# Patient Record
Sex: FEMALE | Race: WHITE | NOT HISPANIC OR LATINO | Employment: FULL TIME | URBAN - METROPOLITAN AREA
[De-identification: names, ages, dates, MRNs, and addresses within clinical notes are randomized per-mention and may not be internally consistent; named-entity substitution may affect disease eponyms.]

---

## 2018-06-17 ENCOUNTER — OFFICE VISIT (OUTPATIENT)
Dept: URGENT CARE | Facility: URGENT CARE | Age: 43
End: 2018-06-17
Payer: COMMERCIAL

## 2018-06-17 VITALS
SYSTOLIC BLOOD PRESSURE: 98 MMHG | HEART RATE: 80 BPM | DIASTOLIC BLOOD PRESSURE: 70 MMHG | OXYGEN SATURATION: 98 % | WEIGHT: 160 LBS | RESPIRATION RATE: 16 BRPM | TEMPERATURE: 98.8 F | BODY MASS INDEX: 25.11 KG/M2 | HEIGHT: 67 IN

## 2018-06-17 DIAGNOSIS — R82.90 NONSPECIFIC FINDING ON EXAMINATION OF URINE: ICD-10-CM

## 2018-06-17 DIAGNOSIS — N12 PYELONEPHRITIS: Primary | ICD-10-CM

## 2018-06-17 DIAGNOSIS — R10.31 ABDOMINAL PAIN, RIGHT LOWER QUADRANT: ICD-10-CM

## 2018-06-17 LAB
ALBUMIN UR-MCNC: 100 MG/DL
APPEARANCE UR: ABNORMAL
BACTERIA #/AREA URNS HPF: ABNORMAL /HPF
BILIRUB UR QL STRIP: NEGATIVE
COLOR UR AUTO: YELLOW
GLUCOSE UR STRIP-MCNC: NEGATIVE MG/DL
HGB UR QL STRIP: ABNORMAL
KETONES UR STRIP-MCNC: NEGATIVE MG/DL
LEUKOCYTE ESTERASE UR QL STRIP: ABNORMAL
NITRATE UR QL: NEGATIVE
NON-SQ EPI CELLS #/AREA URNS LPF: ABNORMAL /LPF
PH UR STRIP: 6 PH (ref 5–7)
RBC #/AREA URNS AUTO: ABNORMAL /HPF
SOURCE: ABNORMAL
SP GR UR STRIP: 1.02 (ref 1–1.03)
UROBILINOGEN UR STRIP-ACNC: 0.2 EU/DL (ref 0.2–1)
WBC #/AREA URNS AUTO: ABNORMAL /HPF

## 2018-06-17 PROCEDURE — 87088 URINE BACTERIA CULTURE: CPT | Performed by: FAMILY MEDICINE

## 2018-06-17 PROCEDURE — 87186 SC STD MICRODIL/AGAR DIL: CPT | Performed by: FAMILY MEDICINE

## 2018-06-17 PROCEDURE — 87086 URINE CULTURE/COLONY COUNT: CPT | Performed by: FAMILY MEDICINE

## 2018-06-17 PROCEDURE — 99203 OFFICE O/P NEW LOW 30 MIN: CPT | Mod: 25 | Performed by: FAMILY MEDICINE

## 2018-06-17 PROCEDURE — 96372 THER/PROPH/DIAG INJ SC/IM: CPT | Performed by: FAMILY MEDICINE

## 2018-06-17 PROCEDURE — 81001 URINALYSIS AUTO W/SCOPE: CPT | Performed by: FAMILY MEDICINE

## 2018-06-17 RX ORDER — PHENAZOPYRIDINE HYDROCHLORIDE 200 MG/1
200 TABLET, FILM COATED ORAL 3 TIMES DAILY PRN
Qty: 6 TABLET | Refills: 0 | Status: SHIPPED | OUTPATIENT
Start: 2018-06-17 | End: 2018-06-17

## 2018-06-17 RX ORDER — CEPHALEXIN 500 MG/1
500 CAPSULE ORAL 3 TIMES DAILY
Qty: 30 CAPSULE | Refills: 0 | Status: SHIPPED | OUTPATIENT
Start: 2018-06-17 | End: 2018-06-27

## 2018-06-17 RX ORDER — CEFTRIAXONE 1 G/1
1000 INJECTION, POWDER, FOR SOLUTION INTRAMUSCULAR; INTRAVENOUS ONCE
Qty: 10 ML | Refills: 0 | OUTPATIENT
Start: 2018-06-17 | End: 2018-06-17

## 2018-06-17 NOTE — PROGRESS NOTES
"SUBJECTIVE: Erin Strickland is a 42 year old female who complains of right lower quadrant pain x 3 days but worsening over night, with associated right flank pain, some feverish feeling and some chills but no abnormal vaginal discharge or bleeding.  Has h/o UTIs and did start taking Cipro 3 days ago.  Last stool was last night.  No nausea or vomiting.  H/o C/S x 1.      OBJECTIVE:  BP 98/70  Pulse 80  Temp 98.8  F (37.1  C) (Oral)  Resp 16  Ht 5' 6.75\" (1.695 m)  Wt 160 lb (72.6 kg)  LMP 05/17/2018  SpO2 98%  Breastfeeding? No  BMI 25.25 kg/m2   Appears well, in no apparent distress.  The abdomen has normal bowel sounds, is soft with mild suprapubic and right lower quadrant tenderness but no guarding, mass, or organomegaly.  Mild right lower quadrant rebound tenderness.  Right CVA tenderness.    Labs:   Results for orders placed or performed in visit on 06/17/18   *UA reflex to Microscopic and Culture ([place] [facility] (except [place] and [place])   Result Value Ref Range    Color Urine Yellow     Appearance Urine Slightly Cloudy     Glucose Urine Negative NEG^Negative mg/dL    Bilirubin Urine Negative NEG^Negative    Ketones Urine Negative NEG^Negative mg/dL    Specific Gravity Urine 1.020 1.003 - 1.035    Blood Urine Moderate (A) NEG^Negative    pH Urine 6.0 5.0 - 7.0 pH    Protein Albumin Urine 100 (A) NEG^Negative mg/dL    Urobilinogen Urine 0.2 0.2 - 1.0 EU/dL    Nitrite Urine Negative NEG^Negative    Leukocyte Esterase Urine Moderate (A) NEG^Negative    Source Midstream Urine    Urine Microscopic   Result Value Ref Range    WBC Urine 25-50 (A) OTO5^0 - 5 /HPF    RBC Urine 25-50 (A) OTO2^O - 2 /HPF    Squamous Epithelial /LPF Urine Moderate (A) FEW^Few /LPF    Bacteria Urine Many (A) NEG^Negative /HPF      ASSESSMENT:  Right pyelonephritis    PLAN: Treatment per orders - also push fluids.  UC is pending. Call or return to clinic prn if these symptoms worsen or fail to improve as " anticipated.   Shannen Ford MD

## 2018-06-17 NOTE — PATIENT INSTRUCTIONS
Discharge Instructions for Pyelonephritis  You have been told you have a kidney infection. This is called pyelonephritis. The infection can be serious. It can damage your kidneys and cause bacteria to enter your bloodstream. You were treated in the hospital. Once you return home, here s what you can do at home to aid in your recovery and prevent future infections.  Home care    Take all the medicine you were prescribed, even if you feel better. Not finishing the medicine can make the infection come back. It may also make a future infection harder to treat.    Unless told not to by your healthcare provider, drink 8 to 12 glasses of fluid every day. Clear fluids, such as water, are best. This may help flush the infection from your system.  Preventing future infection    Keep your genital area clean. Use mild soap. Rinse with water.    If you are a woman, always wipe the genital area from front to back.    Urinate frequently. Avoid holding urine in the bladder for a long time.    Always urinate after sexual intercourse.  Follow-up care  Follow up with your healthcare provider, or as advised. And see your healthcare provider for regular lab tests as directed.     When to call your healthcare provider  Call your healthcare provider right away if you have any of the following:    Decreased urine output or trouble urinating    Severe pain in the lower back or flank    Fever of 100.4 F (38 C) or higher, or as directed by your healthcare provider    Shaking chills    Vomiting    Blood in your urine    Dark-colored or foul-smelling urine    Nausea or other problems that prevent you from taking your prescribed medicine   Date Last Reviewed: 2/1/2017 2000-2017 [employer]. [address] [place] [address], [place] [address]. All rights reserved. This information is not intended as a substitute for professional medical care. Always follow your healthcare professional's instructions.        Kidney Infection (Adult  "Female)    An infection in one or both kidneys is called \"pyelonephritis.\" It usually happens when bacteria (or rarely, viruses, fungi, or other disease-causing organisms) get into the kidney. The bacteria (or other disease-causing organisms) can enter the kidneys from the bladder or blood traveling from other parts of the body. A kidney infection can become serious. It can cause severe illness, scarring of the kidneys, or kidney failure if not treated properly.  Common causes for this problem include:    Not keeping the genital area clean and dry, which promotes the growth of bacteria    Wiping back to front which drags bacteria from the rectum toward the urinary opening (urethra)    Wearing tight pants or underwear (this lets moisture build up in the genital area, which helps bacteria grow)    Holding urine in for long periods of time    Dehydration  Kidney infections can cause symptoms similar to a bladder infection. Symptoms include:    Pain (or burning) when urinating    Having to urinate more often than usual    Blood in the urine (pink or red)    Abdominal pain or discomfort, usually in the lower abdomen    Pain in the side or back    Pain above the pubic bone    Fever or chills    Vomiting    Loss of appetite  Treatment is oral antibiotics, or in more severe cases, intramuscular or IV antibiotics. These are started right away and may be changed once urine culture results determine the infecting organisms. Treatment helps prevent a more serious kidney infection.  Medicines  Medicines can help in the treatment of a bladder infection:    Take antibiotics until they are used up, even if you feel better. It is important to finish them to make sure the infection is gone.    Unless another medicine was prescribed, you can use over-the-counter medicines for pain, fever, or discomfort. If you have chronic liver of kidney disease, talk with your healthcare provider before using these medicines. Also talk with your " provider if you've ever had a stomach ulcer or gastrointestinal (GI) bleeding, or are taking blood thinners.  Home care  The following are general care guidelines:    Stay home from work or school. Rest in bed until your fever breaks and you are feeling better, or as advised by your healthcare provider.    Drink lots of fluid unless you must restrict fluids for other medical reasons. This will force the medicine into your urinary system and flush the bacteria out of your body. Ask your healthcare provider how much you should drink.    Don't have sex until you have finished all of your medicine and your symptoms are gone.    Don't have caffeine, alcohol, or spicy foods. These foods may irritate the kidneys and bladder.    Don't take bubble baths. Sensitivity to the chemicals in bubble baths can irritate the urethra.    Make sure you wipe from front to back after using the toilet.    Wear loose cloths and cotton underwear.  Prevention  These self-care steps can help prevent future infections:    Drink plenty of fluids to prevent dehydration and flush out the bladder. Do this unless you must restrict fluids for other health reasons, or your healthcare provider told you not to.    Proper cleaning after going to the bathroom in important. Make sure you wipe from front to back after using the toilet.    Urinate more often. Don't try to hold urine in for a long time.    Don't wear tight-fitting pants and underwear.    Improve your diet to prevent constipation. Eat more fruits, vegetables, and fiber. Eat less junk and fatty foods. Constipation can make a urinary tract infection more likely. Talk with your healthcare provider if you have trouble with bowel movements.    Urinate right after intercourse to flush out the bladder.  Follow-up care  Follow up with your healthcare provider, or as advised. Additional testing may be needed to make sure the infection has cleared. Close follow-up and further testing is very important  to find the cause and to prevent future infections.  If a urine culture was done, you will be contacted if your treatment needs to be changed. If directed, you may call to find out the results.  If you had an X-ray, CT scan, or other diagnostic test, you will be notified of any new findings that may affect your care.  Call 911  Call 911 if any of the following occur:    Trouble breathing    Fainting or loss of consciousness    Rapid or very slow heart rate    Weakness, dizziness, or fainting    Difficulty arousing or confusion  When to seek medical advice  Call your healthcare provider right away if any of these occur:    Fever 100.4 F (38 C) or higher, or as directed by your healthcare provider    Not feeling better within 1 to 2 days after starting antibiotics    Any symptom that continues after 3 days of treatment    Increasing pain in the stomach, back, side, or groin area    Repeated vomiting    Not able to take prescribed medicine due to nausea or another reason    Bloody, dark-colored, or foul smelling urine    Trouble urinating or decreased urine output    No urine for 8 hours, no tears when crying, sunken eyes, or dry mouth  Date Last Reviewed: 10/1/2016    5115-0441 [employer]. [address] [place] [address], [place] [address]. All rights reserved. This information is not intended as a substitute for professional medical care. Always follow your healthcare professional's instructions.

## 2018-06-17 NOTE — MR AVS SNAPSHOT
After Visit Summary   6/17/2018    Erin Strickland    MRN: [id number]           Patient Information     Date Of Birth          1975        Visit Information        Provider Department      6/17/2018 10:15 AM Shannen Victoria MD [facility]        Today's Diagnoses     Pyelonephritis    -  1    Abdominal pain, right lower quadrant        Nonspecific finding on examination of urine          Care Instructions      Discharge Instructions for Pyelonephritis  You have been told you have a kidney infection. This is called pyelonephritis. The infection can be serious. It can damage your kidneys and cause bacteria to enter your bloodstream. You were treated in the hospital. Once you return home, here s what you can do at home to aid in your recovery and prevent future infections.  Home care    Take all the medicine you were prescribed, even if you feel better. Not finishing the medicine can make the infection come back. It may also make a future infection harder to treat.    Unless told not to by your healthcare provider, drink 8 to 12 glasses of fluid every day. Clear fluids, such as water, are best. This may help flush the infection from your system.  Preventing future infection    Keep your genital area clean. Use mild soap. Rinse with water.    If you are a woman, always wipe the genital area from front to back.    Urinate frequently. Avoid holding urine in the bladder for a long time.    Always urinate after sexual intercourse.  Follow-up care  Follow up with your healthcare provider, or as advised. And see your healthcare provider for regular lab tests as directed.     When to call your healthcare provider  Call your healthcare provider right away if you have any of the following:    Decreased urine output or trouble urinating    Severe pain in the lower back or flank    Fever of 100.4 F (38 C) or higher, or as directed by your healthcare provider    Shaking  "chills    Vomiting    Blood in your urine    Dark-colored or foul-smelling urine    Nausea or other problems that prevent you from taking your prescribed medicine   Date Last Reviewed: 2/1/2017 2000-2017 [employer]. [address] [place] [address], [place] [address]. All rights reserved. This information is not intended as a substitute for professional medical care. Always follow your healthcare professional's instructions.        Kidney Infection (Adult Female)    An infection in one or both kidneys is called \"pyelonephritis.\" It usually happens when bacteria (or rarely, viruses, fungi, or other disease-causing organisms) get into the kidney. The bacteria (or other disease-causing organisms) can enter the kidneys from the bladder or blood traveling from other parts of the body. A kidney infection can become serious. It can cause severe illness, scarring of the kidneys, or kidney failure if not treated properly.  Common causes for this problem include:    Not keeping the genital area clean and dry, which promotes the growth of bacteria    Wiping back to front which drags bacteria from the rectum toward the urinary opening (urethra)    Wearing tight pants or underwear (this lets moisture build up in the genital area, which helps bacteria grow)    Holding urine in for long periods of time    Dehydration  Kidney infections can cause symptoms similar to a bladder infection. Symptoms include:    Pain (or burning) when urinating    Having to urinate more often than usual    Blood in the urine (pink or red)    Abdominal pain or discomfort, usually in the lower abdomen    Pain in the side or back    Pain above the pubic bone    Fever or chills    Vomiting    Loss of appetite  Treatment is oral antibiotics, or in more severe cases, intramuscular or IV antibiotics. These are started right away and may be changed once urine culture results determine the infecting organisms. Treatment helps prevent a more serious " kidney infection.  Medicines  Medicines can help in the treatment of a bladder infection:    Take antibiotics until they are used up, even if you feel better. It is important to finish them to make sure the infection is gone.    Unless another medicine was prescribed, you can use over-the-counter medicines for pain, fever, or discomfort. If you have chronic liver of kidney disease, talk with your healthcare provider before using these medicines. Also talk with your provider if you've ever had a stomach ulcer or gastrointestinal (GI) bleeding, or are taking blood thinners.  Home care  The following are general care guidelines:    Stay home from work or school. Rest in bed until your fever breaks and you are feeling better, or as advised by your healthcare provider.    Drink lots of fluid unless you must restrict fluids for other medical reasons. This will force the medicine into your urinary system and flush the bacteria out of your body. Ask your healthcare provider how much you should drink.    Don't have sex until you have finished all of your medicine and your symptoms are gone.    Don't have caffeine, alcohol, or spicy foods. These foods may irritate the kidneys and bladder.    Don't take bubble baths. Sensitivity to the chemicals in bubble baths can irritate the urethra.    Make sure you wipe from front to back after using the toilet.    Wear loose cloths and cotton underwear.  Prevention  These self-care steps can help prevent future infections:    Drink plenty of fluids to prevent dehydration and flush out the bladder. Do this unless you must restrict fluids for other health reasons, or your healthcare provider told you not to.    Proper cleaning after going to the bathroom in important. Make sure you wipe from front to back after using the toilet.    Urinate more often. Don't try to hold urine in for a long time.    Don't wear tight-fitting pants and underwear.    Improve your diet to prevent constipation.  Eat more fruits, vegetables, and fiber. Eat less junk and fatty foods. Constipation can make a urinary tract infection more likely. Talk with your healthcare provider if you have trouble with bowel movements.    Urinate right after intercourse to flush out the bladder.  Follow-up care  Follow up with your healthcare provider, or as advised. Additional testing may be needed to make sure the infection has cleared. Close follow-up and further testing is very important to find the cause and to prevent future infections.  If a urine culture was done, you will be contacted if your treatment needs to be changed. If directed, you may call to find out the results.  If you had an X-ray, CT scan, or other diagnostic test, you will be notified of any new findings that may affect your care.  Call 911  Call 911 if any of the following occur:    Trouble breathing    Fainting or loss of consciousness    Rapid or very slow heart rate    Weakness, dizziness, or fainting    Difficulty arousing or confusion  When to seek medical advice  Call your healthcare provider right away if any of these occur:    Fever 100.4 F (38 C) or higher, or as directed by your healthcare provider    Not feeling better within 1 to 2 days after starting antibiotics    Any symptom that continues after 3 days of treatment    Increasing pain in the stomach, back, side, or groin area    Repeated vomiting    Not able to take prescribed medicine due to nausea or another reason    Bloody, dark-colored, or foul smelling urine    Trouble urinating or decreased urine output    No urine for 8 hours, no tears when crying, sunken eyes, or dry mouth  Date Last Reviewed: 10/1/2016    1905-0222 [employer]. [address] [place] [address], [place] [address]. All rights reserved. This information is not intended as a substitute for professional medical care. Always follow your healthcare professional's instructions.                Follow-ups after your visit       "  Follow-up notes from your care team     Return if symptoms worsen or fail to improve.      Who to contact     If you have questions or need follow up information about today's clinic visit or your schedule please contact [facility] directly at [phone].  Normal or non-critical lab and imaging results will be communicated to you by MyChart, letter or phone within 4 business days after the clinic has received the results. If you do not hear from us within 7 days, please contact the clinic through MyChart or phone. If you have a critical or abnormal lab result, we will notify you by phone as soon as possible.  Submit refill requests through [employer] or call your pharmacy and they will forward the refill request to us. Please allow 3 business days for your refill to be completed.          Additional Information About Your Visit        MyChart Information     [employer] lets you send messages to your doctor, view your test results, renew your prescriptions, schedule appointments and more. To sign up, go to [email or web address] . Click on \"Log in\" on the left side of the screen, which will take you to the Welcome page. Then click on \"Sign up Now\" on the right side of the page.     You will be asked to enter the access code listed below, as well as some personal information. Please follow the directions to create your username and password.     Your access code is: [email or web address]  Expires: 9/15/2018 10:56 AM     Your access code will  in 90 days. If you need help or a new code, please call your [facility] clinic or [phone].        Care EveryWhere ID     This is your Care EveryWhere ID. This could be used by other organizations to access your [facility] medical records  [id number]        Your Vitals Were     Pulse Temperature Respirations Height Last Period Pulse Oximetry    80 98.8  F (37.1  C) (Oral) 16 5' 6.75\" (1.695 m) 2018 98%    Breastfeeding? BMI (Body Mass Index)          "       No 25.25 kg/m2           Blood Pressure from Last 3 Encounters:   06/17/18 98/70   10/29/07 102/60   10/26/05 118/70    Weight from Last 3 Encounters:   06/17/18 160 lb (72.6 kg)   10/29/07 154 lb 9.6 oz (70.1 kg)   10/26/05 142 lb (64.4 kg)              We Performed the Following     *UA reflex to Microscopic and Culture ([place] and [facility] (except [place] and [place])     Urine Culture Aerobic Bacterial     Urine Microscopic          Today's Medication Changes          These changes are accurate as of 6/17/18 10:56 AM.  If you have any questions, ask your nurse or doctor.               Start taking these medicines.        Dose/Directions    cefTRIAXone 1 GM vial   Commonly known as:  ROCEPHIN   Used for:  Pyelonephritis   Started by:  Shannen Victoria MD        Dose:  1000 mg   Inject 1 g (1,000 mg) into the muscle once for 1 dose   Quantity:  10 mL   Refills:  0       cephALEXin 500 MG capsule   Commonly known as:  KEFLEX   Used for:  Pyelonephritis   Started by:  Shannen Victoria MD        Dose:  500 mg   Take 1 capsule (500 mg) by mouth 3 times daily for 10 days   Quantity:  30 capsule   Refills:  0       phenazopyridine 200 MG tablet   Commonly known as:  PYRIDIUM   Used for:  Pyelonephritis   Started by:  Shnanen Victoria MD        Dose:  200 mg   Take 1 tablet (200 mg) by mouth 3 times daily as needed for irritation   Quantity:  6 tablet   Refills:  0         Stop taking these medicines if you haven't already. Please contact your care team if you have questions.     HYDROCODONE CP OR   Stopped by:  Shannen Victoria MD           MACROBID 100 MG capsule   Generic drug:  nitroFURantoin (macrocrystal-monohydrate)   Stopped by:  Shannen Victoria MD           metroNIDAZOLE 250 MG tablet   Commonly known as:  FLAGYL   Stopped by:  Shannen Victoria MD           MIRENA (52 MG) 20 MCG/24HR IUD   Generic drug:   levonorgestrel   Stopped by:  Shannen Victoria MD           NUVARING 0.12-0.015 MG/24HR vaginal ring   Generic drug:  etonogestrel-ethinyl estradiol   Stopped by:  Shannen Victoria MD           STRATTERA 10 MG capsule   Generic drug:  atomoxetine   Stopped by:  Shannen Victoria MD           VALTREX 500 MG tablet   Generic drug:  valACYclovir   Stopped by:  Shannen Victoria MD                Where to get your medicines      These medications were sent to [id number] - [place], [place] - [address]  [address] [facility] [address]     Phone:  [phone]     cephALEXin 500 MG capsule    phenazopyridine 200 MG tablet         Some of these will need a paper prescription and others can be bought over the counter.  Ask your nurse if you have questions.     You don't need a prescription for these medications     cefTRIAXone 1 GM vial                Primary Care Provider Fax #    Physician No Ref-Primary [phone]       No address on file        Equal Access to Services     [facility]: Hadii jemal robersono Sogwen, waaxda luqadaha, qaybta kaalmada aderadha, parul cook . So [facility] [phone].    ATENCIÓN: Si habla español, tiene a bee disposición servicios gratuitos de asistencia lingüística. [facility] [phone].    We comply with applicable federal civil rights laws and [place] laws. We do not discriminate on the basis of race, color, national origin, age, disability, sex, sexual orientation, or gender identity.            Thank you!     Thank you for choosing [facility]  for your care. Our goal is always to provide you with excellent care. Hearing back from our patients is one way we can continue to improve our services. Please take a few minutes to complete the written survey that you may receive in the mail after your visit with us. Thank you!             Your Updated Medication List -  Protect others around you: Learn how to safely use, store and throw away your medicines at www.disposemymeds.org.          This list is accurate as of 6/17/18 10:56 AM.  Always use your most recent med list.                   Brand Name Dispense Instructions for use Diagnosis    cefTRIAXone 1 GM vial    ROCEPHIN    10 mL    Inject 1 g (1,000 mg) into the muscle once for 1 dose    Pyelonephritis       cephALEXin 500 MG capsule    KEFLEX    30 capsule    Take 1 capsule (500 mg) by mouth 3 times daily for 10 days    Pyelonephritis       CIPROFLOXACIN PO           phenazopyridine 200 MG tablet    PYRIDIUM    6 tablet    Take 1 tablet (200 mg) by mouth 3 times daily as needed for irritation    Pyelonephritis

## 2018-06-19 ENCOUNTER — TELEPHONE (OUTPATIENT)
Dept: URGENT CARE | Facility: URGENT CARE | Age: 43
End: 2018-06-19

## 2018-06-19 LAB
BACTERIA SPEC CULT: ABNORMAL
SPECIMEN SOURCE: ABNORMAL

## 2018-06-20 NOTE — TELEPHONE ENCOUNTER
Spoke with pt, gave her following message:      Patient started her Cipro of her own and was started on Keflex 500 mg one tablet three times a day 6/17/2018 and the organism is E Coli ESBL which is resistant to Cefazolin, Cefepime, Ceftriaxone, Ceftazidime but sensitive to Cefoxitin therefore discontinue current medication and start Macrobid 100 mg one tablet twice a days for 7 days and please call this into the pharmacy. Please have patient to follow up in clinic or primary if not improving.   Dina Thorne        Pt stated she would like the Rx called in to [facility] and she will pick it up in AM.  I called Rx in (left message on pharmacy voicemail).    Dominga Rawls RN

## 2024-05-15 ENCOUNTER — LAB REQUISITION (OUTPATIENT)
Dept: LAB | Facility: CLINIC | Age: 49
End: 2024-05-15
Payer: COMMERCIAL

## 2024-05-15 DIAGNOSIS — R31.29 OTHER MICROSCOPIC HEMATURIA: ICD-10-CM

## 2024-05-15 LAB
ALBUMIN UR-MCNC: NEGATIVE MG/DL
APPEARANCE UR: CLEAR
BACTERIA #/AREA URNS HPF: ABNORMAL /HPF
BILIRUB UR QL STRIP: NEGATIVE
COLOR UR AUTO: ABNORMAL
GLUCOSE UR STRIP-MCNC: NEGATIVE MG/DL
HGB UR QL STRIP: NEGATIVE
KETONES UR STRIP-MCNC: NEGATIVE MG/DL
LEUKOCYTE ESTERASE UR QL STRIP: NEGATIVE
MUCOUS THREADS #/AREA URNS LPF: PRESENT /LPF
NITRATE UR QL: NEGATIVE
PH UR STRIP: 6 [PH] (ref 5–7)
RBC URINE: 0 /HPF
SP GR UR STRIP: 1 (ref 1–1.03)
TRANSITIONAL EPI: <1 /HPF
UROBILINOGEN UR STRIP-MCNC: NORMAL MG/DL
WBC URINE: 1 /HPF

## 2024-05-15 PROCEDURE — 81001 URINALYSIS AUTO W/SCOPE: CPT | Mod: ORL | Performed by: OBSTETRICS & GYNECOLOGY

## 2024-05-15 PROCEDURE — 87186 SC STD MICRODIL/AGAR DIL: CPT | Mod: ORL | Performed by: OBSTETRICS & GYNECOLOGY

## 2024-05-15 PROCEDURE — 87086 URINE CULTURE/COLONY COUNT: CPT | Mod: ORL | Performed by: OBSTETRICS & GYNECOLOGY

## 2024-05-18 LAB — BACTERIA UR CULT: ABNORMAL

## 2024-06-26 ENCOUNTER — ANCILLARY PROCEDURE (OUTPATIENT)
Dept: MRI IMAGING | Facility: CLINIC | Age: 49
End: 2024-06-26
Attending: OBSTETRICS & GYNECOLOGY
Payer: COMMERCIAL

## 2024-06-26 DIAGNOSIS — N93.9 ABNORMAL UTERINE BLEEDING: ICD-10-CM

## 2024-06-26 DIAGNOSIS — D21.9 FIBROIDS: ICD-10-CM

## 2024-06-26 PROCEDURE — 72197 MRI PELVIS W/O & W/DYE: CPT | Mod: TC | Performed by: RADIOLOGY

## 2024-06-26 PROCEDURE — A9585 GADOBUTROL INJECTION: HCPCS | Performed by: RADIOLOGY

## 2024-06-26 RX ORDER — GADOBUTROL 604.72 MG/ML
0.1 INJECTION INTRAVENOUS ONCE
Status: COMPLETED | OUTPATIENT
Start: 2024-06-26 | End: 2024-06-26

## 2024-06-26 RX ADMIN — GADOBUTROL 7.2 ML: 604.72 INJECTION INTRAVENOUS at 15:46
